# Patient Record
Sex: MALE | Race: WHITE | NOT HISPANIC OR LATINO | Employment: OTHER | URBAN - NONMETROPOLITAN AREA
[De-identification: names, ages, dates, MRNs, and addresses within clinical notes are randomized per-mention and may not be internally consistent; named-entity substitution may affect disease eponyms.]

---

## 2019-09-03 ENCOUNTER — HOSPITAL ENCOUNTER (OUTPATIENT)
Dept: CT IMAGING | Facility: HOSPITAL | Age: 29
Discharge: HOME | End: 2019-09-03
Admitting: NURSE PRACTITIONER

## 2019-09-03 PROCEDURE — 74176 CT ABD & PELVIS W/O CONTRAST: CPT

## 2019-09-03 RX ADMIN — BARIUM SULFATE 900 ML: 21 SUSPENSION ORAL at 12:25

## 2019-09-04 ENCOUNTER — TELEPHONE (OUTPATIENT)
Dept: INTERNAL MEDICINE | Facility: CLINIC | Age: 29
End: 2019-09-04

## 2019-09-04 RX ORDER — DICYCLOMINE HCL 20 MG
20 TABLET ORAL EVERY 6 HOURS
Qty: 60 TABLET | Refills: 0 | Status: SHIPPED | OUTPATIENT
Start: 2019-09-04 | End: 2022-01-27

## 2019-09-04 NOTE — TELEPHONE ENCOUNTER
Patient saw you yesterday, was sent for CT. He is also self pay. Called later on yesterday stating he remembered he had similar symptoms in the past & was diagnosed with an infection of his colon. Given amoxicillin and it got better. Patient is requesting an AB.     Please send in to [facility] if before 4pm. This patient actually lives in [place].      Thank you.

## 2019-09-04 NOTE — TELEPHONE ENCOUNTER
Patient reports continued abdominal cramping with loose stool.  He is insisting on a prescription for amoxicillin.   I have reviewed CAT scan results with patient.  There was no evidence of colitis.  I have called and spoken with radiologist to verify there is no evidence of colitis.  The radiologist said no evidence of colitis.  I have spoken with patient again.  I will send in a prescription for Bentyl.  I have encouraged use of probiotic and Metamucil to thicken stool.  I have declined to do a prescription for amoxicillin.  Have recommended if he continues with his symptoms to follow-up with his primary care.  Patient was not happy with this answer as he does not have traditional insurance and has to pay everything out of pocket before being reimbursed.

## 2019-09-04 NOTE — TELEPHONE ENCOUNTER
Contacted patient to make sure he was able to  script. He states he had to go on to [place] and requested that it be resent to [facility] in [facility]. Contacted [facility] in [place] and they said [facility] had already filled it but they would call them and try to get it transferred.

## 2022-01-11 ENCOUNTER — LAB (OUTPATIENT)
Dept: LAB | Facility: HOSPITAL | Age: 32
End: 2022-01-11

## 2022-02-14 ENCOUNTER — OFFICE VISIT (OUTPATIENT)
Dept: INTERNAL MEDICINE | Facility: CLINIC | Age: 32
End: 2022-02-14

## 2022-02-14 VITALS
HEART RATE: 84 BPM | SYSTOLIC BLOOD PRESSURE: 144 MMHG | TEMPERATURE: 98 F | BODY MASS INDEX: 27.77 KG/M2 | DIASTOLIC BLOOD PRESSURE: 98 MMHG | WEIGHT: 205 LBS | HEIGHT: 72 IN | OXYGEN SATURATION: 99 %

## 2022-02-14 DIAGNOSIS — M54.12 LEFT CERVICAL RADICULOPATHY: ICD-10-CM

## 2022-02-14 DIAGNOSIS — R21 RASH: ICD-10-CM

## 2022-02-14 DIAGNOSIS — M54.16 LUMBAR RADICULOPATHY: Primary | ICD-10-CM

## 2022-02-14 DIAGNOSIS — Z71.85 VACCINE COUNSELING: ICD-10-CM

## 2022-02-14 DIAGNOSIS — I10 PRIMARY HYPERTENSION: ICD-10-CM

## 2022-02-14 PROCEDURE — 90471 IMMUNIZATION ADMIN: CPT | Performed by: INTERNAL MEDICINE

## 2022-02-14 PROCEDURE — 90715 TDAP VACCINE 7 YRS/> IM: CPT | Performed by: INTERNAL MEDICINE

## 2022-02-14 PROCEDURE — 99203 OFFICE O/P NEW LOW 30 MIN: CPT | Performed by: INTERNAL MEDICINE

## 2022-02-14 NOTE — PROGRESS NOTES
Subjective   Toni Cage is a 31 y.o. male.     Chief Complaint   Patient presents with   • Establish Care   •    • Leg Pain     months; intermittent; symptoms worsening; left        History of Present Illness   Patient here to establish care.  Patient complaints sharp pain from left neck radiate to left upper arm and left lower back to left leg all the way to the toes comes and goes.  Patient is a  pain worse with working.  Some numbness tingling.  Patient also had rash in the buttock area patient is a worry about possible herpes related rash and shingle because the nerve pain.  Patient has appointment with spine specialist tomorrow.  Patient had an MRI done for the spine no report available.  Blood pressure is also high today patient is a worry about the pain.10 months leg radiculopathy off and on .  Patient states he is stressing out because the leg pain.  Home blood pressure is normal.  No current outpatient medications on file.    The following portions of the patient's history were reviewed and updated as appropriate: allergies, current medications, past family history, past medical history, past social history, past surgical history and problem list.    Review of Systems   Constitutional: Negative.    Respiratory: Negative.    Cardiovascular: Negative.    Gastrointestinal: Negative.    Musculoskeletal: Negative.    Skin: Negative.    Neurological: Positive for numbness. Negative for weakness.        Left leg pain    Psychiatric/Behavioral: Negative.        Objective   Physical Exam  Cardiovascular:      Rate and Rhythm: Normal rate.      Heart sounds: Normal heart sounds.   Pulmonary:      Effort: Pulmonary effort is normal.      Breath sounds: Normal breath sounds.   Abdominal:      General: Bowel sounds are normal.   Musculoskeletal:      Comments: Left leg radiculopathy   Skin:     General: Skin is warm.   Neurological:      Mental Status: He is alert and oriented to person,  place, and time.         All tests have been reviewed.    Assessment/Plan   Diagnoses and all orders for this visit:    Lumbar radiculopathy patient does not want any medication or physical therapy and patient wants to discuss with spine specialist first.    Left cervical radiculopathy declined meds and a physical therapy.  Pending to see spine specialist tomorrow    Primary hypertension continue to watch possibly due to stress if continues to be high patient knows to call    Vaccine counseling  -     Tdap Vaccine Greater Than or Equal To 6yo IM    Rash sacral probably due to heat caused erythrasma.  Asked patient to wear ventilated pants.    Patient to call to schedule physical.  Patient has no insurance.